# Patient Record
(demographics unavailable — no encounter records)

---

## 2025-06-25 NOTE — PHYSICAL EXAM
[Alert] : alert [Normal Voice/Communication] : normal voice/communication [Healthy Appearing] : healthy appearing [No Acute Distress] : no acute distress [Sclera] : the sclera and conjunctiva were normal [Hearing Threshold Finger Rub Not [place]] : hearing was normal [Normal Lips/Gums] : the lips and gums were normal [Oropharynx] : the oropharynx was normal [Normal Appearance] : the appearance of the neck was normal [No Neck Mass] : no neck mass was observed [No Respiratory Distress] : no respiratory distress [No Acc Muscle Use] : no accessory muscle use [Respiration, Rhythm And Depth] : normal respiratory rhythm and effort [Auscultation Breath Sounds / Voice Sounds] : lungs were clear to auscultation bilaterally [Heart Rate And Rhythm] : heart rate was normal and rhythm regular [Normal S1, S2] : normal S1 and S2 [Murmurs] : no murmurs [Bowel Sounds] : normal bowel sounds [Abdomen Tenderness] : non-tender [No Masses] : no abdominal mass palpated [Abdomen Soft] : soft [] : no hepatosplenomegaly [Oriented To Time, Place, And Person] : oriented to person, place, and time

## 2025-06-25 NOTE — REVIEW OF SYSTEMS
[Feeling Tired] : feeling tired [Palpitations] : palpitations [SOB on Exertion] : shortness of breath during exertion [As Noted in HPI] : as noted in HPI [Negative] : Heme/Lymph [FreeTextEntry5] : TAVR, Stent

## 2025-06-25 NOTE — ASSESSMENT
[FreeTextEntry1] : Patient with history of hematochezia and severe anemia 3 years ago.  He is now off anticoagulants after having a TAVR and a Watchman procedure.  He only takes aspirin 81 mg/day.  6 weeks ago he had 1 episode of bright red blood on the paper and in the bowl.  This may have been due to hemorrhoids.  He does have a history of diverticulosis.  Hematocrit was 41 with mild iron deficiency.  He is taking an iron tablet once a day.  FOBT will be sent to the lab. He does have heartburn and takes omeprazole once a day. If FOBT is positive, he may need a capsule endoscopy followed by upper endoscopy and colonoscopy if the capsule is negative.  He will be seen in follow-up in 1 month.

## 2025-06-25 NOTE — HISTORY OF PRESENT ILLNESS
[FreeTextEntry1] : Patient is an 80-year-old man recently discharged from the hospital.  He has a history of hypertension, diabetes, and atrial fibrillation.  He was admitted to the hospital on 5/15 for shortness of breath and hematochezia.  Apparently his hemoglobin was 4.8. Patient had a coronary stent placed in 11/2020.  He was subsequently placed on Plavix and a baby aspirin.  He was found to have atrial fibrillation on 4/2021 and was started on Eliquis and aspirin and the Plavix was DC'd.  He has had some intermittent bright red blood per rectum but he attributed this to hemorrhoids. In January and February he appeared somewhat more pale to his family.  In March/April he started complaining of fatigue and shortness of breath.  This was followed by hematochezia and his admission to the hospital. In the hospital he received 4 units of packed cells.  He underwent a colonoscopy that revealed diverticulosis in the sigmoid colon.  The prep was not sufficient but no active bleeding was seen. He underwent an upper endoscopy that revealed a small hiatus hernia.  2 telangiectasias in the stomach were cauterized.  These were not actively bleeding.  There was no bleeding in the small intestine. Patient was discharged on 5/25.  His D/C H/H was 11.1/40, MCV 81.5, BUN 35, creatinine 1.5. He still complains of loose bowel movements.  He does see some bright red blood coating the stool.  After the bowel movement he wipes until the bright red blood has been eliminated.  After his discharge from the hospital, he was restarted on Eliquis 5 mg twice daily and aspirin 81 mg/day.  When he had some bleeding, his Eliquis was reduced to 2.5 mg twice daily.  He continues to take aspirin 81 mg. Patient is having better bowel movements on Bene fiber.  He does not see any blood in his stool.  At times his stool is dark but he is on iron supplements.  Lately his stool was normal color and formed. His last H&H was 10.6/37.4.  6/25/2025-Patient is an 83-year-old gentleman who was seen about 3 years ago after a bout of hematochezia.  His his hemoglobin was down to about 5 and required multiple transfusions.  He had a colonoscopy and upper endoscopy as noted above.  Patient was on anticoagulants at that time. Patient had a TAVR procedure in 2022 and a Watchman procedure in 2023 and anticoagulants were stopped.  He has continued on aspirin 81 mg. 6 weeks ago, he had an episode of bright red blood on the paper and in the bowl.  This happened on 1 occasion.  Several weeks ago he had some loose bowel movements.  He was on metformin 1000 mg twice daily.  When it was changed to extended release his diarrhea improved.  Now he has a formed bowel movement in the morning which is followed by some loose bowel movements.  He denies seeing any blood or mucus in the stool.  His stool is dark because he was started on iron pills which he takes once a day. He does complain of heartburn when he eats certain foods which is relieved by Tums.  He does take OTC omeprazole. Patient has been complaining of some fatigue. Blood work from 6/17/2025 revealed WBC 10, H/H 12.4/41.7, MCV 85.1, platelets 323k, BUNs/creatinine 19/0.99, total protein/albumin 6.4/4, alkaline phosphatase 71, AST/ALT 14/15, iron/TIBC 38/457, 8% iron saturation.

## 2025-07-23 NOTE — REASON FOR VISIT
[Follow-up] : a follow-up of an existing diagnosis [FreeTextEntry1] : Occcult blood in stool, Hx of GI bleed

## 2025-07-23 NOTE — HISTORY OF PRESENT ILLNESS
[FreeTextEntry1] : Patient is an 80-year-old man recently discharged from the hospital.  He has a history of hypertension, diabetes, and atrial fibrillation.  He was admitted to the hospital on 5/15 for shortness of breath and hematochezia.  Apparently his hemoglobin was 4.8. Patient had a coronary stent placed in 11/2020.  He was subsequently placed on Plavix and a baby aspirin.  He was found to have atrial fibrillation on 4/2021 and was started on Eliquis and aspirin and the Plavix was DC'd.  He has had some intermittent bright red blood per rectum but he attributed this to hemorrhoids. In January and February he appeared somewhat more pale to his family.  In March/April he started complaining of fatigue and shortness of breath.  This was followed by hematochezia and his admission to the hospital. In the hospital he received 4 units of packed cells.  He underwent a colonoscopy that revealed diverticulosis in the sigmoid colon.  The prep was not sufficient but no active bleeding was seen. He underwent an upper endoscopy that revealed a small hiatus hernia.  2 telangiectasias in the stomach were cauterized.  These were not actively bleeding.  There was no bleeding in the small intestine. Patient was discharged on 5/25.  His D/C H/H was 11.1/40, MCV 81.5, BUN 35, creatinine 1.5. He still complains of loose bowel movements.  He does see some bright red blood coating the stool.  After the bowel movement he wipes until the bright red blood has been eliminated.  After his discharge from the hospital, he was restarted on Eliquis 5 mg twice daily and aspirin 81 mg/day.  When he had some bleeding, his Eliquis was reduced to 2.5 mg twice daily.  He continues to take aspirin 81 mg. Patient is having better bowel movements on Bene fiber.  He does not see any blood in his stool.  At times his stool is dark but he is on iron supplements.  Lately his stool was normal color and formed. His last H&H was 10.6/37.4.  6/25/2025-Patient is an 83-year-old gentleman who was seen about 3 years ago after a bout of hematochezia.  His his hemoglobin was down to about 5 and required multiple transfusions.  He had a colonoscopy and upper endoscopy as noted above.  Patient was on anticoagulants at that time. Patient had a TAVR procedure in 2022 and a Watchman procedure in 2023 and anticoagulants were stopped.  He has continued on aspirin 81 mg. 6 weeks ago, he had an episode of bright red blood on the paper and in the bowl.  This happened on 1 occasion.  Several weeks ago he had some loose bowel movements.  He was on metformin 1000 mg twice daily.  When it was changed to extended release his diarrhea improved.  Now he has a formed bowel movement in the morning which is followed by some loose bowel movements.  He denies seeing any blood or mucus in the stool.  His stool is dark because he was started on iron pills which he takes once a day. He does complain of heartburn when he eats certain foods which is relieved by Tums.  He does take OTC omeprazole. Patient has been complaining of some fatigue. Blood work from 6/17/2025 revealed WBC 10, H/H 12.4/41.7, MCV 85.1, platelets 323k, BUNs/creatinine 19/0.99, total protein/albumin 6.4/4, alkaline phosphatase 71, AST/ALT 14/15, iron/TIBC 38/457, 8% iron saturation.  7/23/2025-Patient is on iron.  He does complain of some weakness.  He is on iron supplements.  His last H/H was from 6/17 and as noted above.  Stool for occult blood was positive.

## 2025-07-23 NOTE — ASSESSMENT
[FreeTextEntry1] : Patient with occult blood in the stool.  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  He will be scheduled initially for capsule endoscopy.  If the capsule is negative, he will likely need a colonoscopy and upper endoscopy.  CBC and iron studies will be ordered.

## 2025-07-23 NOTE — PHYSICAL EXAM
